# Patient Record
Sex: FEMALE | Race: BLACK OR AFRICAN AMERICAN | NOT HISPANIC OR LATINO | URBAN - METROPOLITAN AREA
[De-identification: names, ages, dates, MRNs, and addresses within clinical notes are randomized per-mention and may not be internally consistent; named-entity substitution may affect disease eponyms.]

---

## 2020-07-30 ENCOUNTER — OFFICE VISIT (OUTPATIENT)
Dept: URBAN - METROPOLITAN AREA CLINIC 12 | Facility: CLINIC | Age: 30
End: 2020-07-30

## 2020-09-24 ENCOUNTER — OFFICE VISIT (OUTPATIENT)
Dept: URBAN - METROPOLITAN AREA CLINIC 12 | Facility: CLINIC | Age: 30
End: 2020-09-24
Payer: COMMERCIAL

## 2020-09-24 DIAGNOSIS — R19.7 DIARRHEA: ICD-10-CM

## 2020-09-24 DIAGNOSIS — K51.90 ULCERATIVE COLITIS: ICD-10-CM

## 2020-09-24 PROCEDURE — G8427 DOCREV CUR MEDS BY ELIG CLIN: HCPCS | Performed by: INTERNAL MEDICINE

## 2020-09-24 PROCEDURE — G8420 CALC BMI NORM PARAMETERS: HCPCS | Performed by: INTERNAL MEDICINE

## 2020-09-24 PROCEDURE — G9903 PT SCRN TBCO ID AS NON USER: HCPCS | Performed by: INTERNAL MEDICINE

## 2020-09-24 PROCEDURE — 99214 OFFICE O/P EST MOD 30 MIN: CPT | Performed by: INTERNAL MEDICINE

## 2020-09-24 RX ORDER — MESALAMINE 1.2 G/1
TAKE 4 TABLETS (4.8 GRAM) BY ORAL ROUTE ONCE DAILY WITH A MEAL TABLET, DELAYED RELEASE ORAL 1
Qty: 360 | Refills: 3 | OUTPATIENT
Start: 2020-09-24 | End: 2021-09-19

## 2020-09-24 RX ORDER — MESALAMINE 1.2 G/1
2 TABLETS TABLET, DELAYED RELEASE ORAL ONCE A DAY
Status: ACTIVE | COMMUNITY

## 2020-09-24 NOTE — HPI-TODAY'S VISIT:
30-year-old female presented for diarrhea abdominal pain and occasional blood in the stool, the patient has history of ulcerative colitis diagnosed in 2016 she was on Lialda 4.8 g daily she had colonoscopy in 2018 revealed active colitis in the rectum and rectosigmoid area the rest of the colon was in remission but there was microscopic changes consistent with pancolitis, she was doing well till recently had multiple episodes of diarrhea or to go to the bathroom with occasionally fecal incontinence, no weight loss no nausea no vomiting she is currently on Lialda 2.4 g daily

## 2020-10-21 ENCOUNTER — TELEPHONE ENCOUNTER (OUTPATIENT)
Dept: URBAN - METROPOLITAN AREA CLINIC 12 | Facility: CLINIC | Age: 30
End: 2020-10-21

## 2020-12-28 ENCOUNTER — TELEPHONE ENCOUNTER (OUTPATIENT)
Dept: URBAN - METROPOLITAN AREA MEDICAL CENTER 27 | Facility: MEDICAL CENTER | Age: 30
End: 2020-12-28

## 2021-11-11 ENCOUNTER — OFFICE VISIT (OUTPATIENT)
Dept: URBAN - METROPOLITAN AREA CLINIC 98 | Facility: CLINIC | Age: 31
End: 2021-11-11

## 2021-12-16 ENCOUNTER — TELEPHONE ENCOUNTER (OUTPATIENT)
Dept: URBAN - METROPOLITAN AREA CLINIC 12 | Facility: CLINIC | Age: 31
End: 2021-12-16

## 2021-12-16 RX ORDER — MESALAMINE 1.2 G/1
4 TABLETS TABLET, DELAYED RELEASE ORAL ONCE A DAY
Qty: 360 TABLET | Refills: 0
Start: 2021-12-16 | End: 2022-03-16

## 2022-02-10 ENCOUNTER — OFFICE VISIT (OUTPATIENT)
Dept: URBAN - METROPOLITAN AREA CLINIC 12 | Facility: CLINIC | Age: 32
End: 2022-02-10

## 2022-09-28 ENCOUNTER — TELEPHONE ENCOUNTER (OUTPATIENT)
Dept: URBAN - METROPOLITAN AREA CLINIC 12 | Facility: CLINIC | Age: 32
End: 2022-09-28

## 2022-09-28 RX ORDER — MESALAMINE 1.2 G/1
4 TABLETS TABLET, DELAYED RELEASE ORAL ONCE A DAY
Qty: 360 TABLET | Refills: 0
Start: 2021-12-16 | End: 2022-12-27

## 2022-09-29 ENCOUNTER — OFFICE VISIT (OUTPATIENT)
Dept: URBAN - METROPOLITAN AREA CLINIC 12 | Facility: CLINIC | Age: 32
End: 2022-09-29

## 2022-10-07 ENCOUNTER — TELEPHONE ENCOUNTER (OUTPATIENT)
Dept: URBAN - METROPOLITAN AREA CLINIC 23 | Facility: CLINIC | Age: 32
End: 2022-10-07

## 2022-10-14 ENCOUNTER — OFFICE VISIT (OUTPATIENT)
Dept: URBAN - METROPOLITAN AREA CLINIC 111 | Facility: CLINIC | Age: 32
End: 2022-10-14

## 2022-12-22 ENCOUNTER — TELEPHONE ENCOUNTER (OUTPATIENT)
Dept: URBAN - METROPOLITAN AREA CLINIC 111 | Facility: CLINIC | Age: 32
End: 2022-12-22

## 2022-12-22 ENCOUNTER — TELEPHONE ENCOUNTER (OUTPATIENT)
Dept: URBAN - METROPOLITAN AREA CLINIC 5 | Facility: CLINIC | Age: 32
End: 2022-12-22

## 2022-12-22 ENCOUNTER — DASHBOARD ENCOUNTERS (OUTPATIENT)
Age: 32
End: 2022-12-22

## 2022-12-22 ENCOUNTER — LAB OUTSIDE AN ENCOUNTER (OUTPATIENT)
Dept: URBAN - METROPOLITAN AREA CLINIC 12 | Facility: CLINIC | Age: 32
End: 2022-12-22

## 2022-12-22 ENCOUNTER — OFFICE VISIT (OUTPATIENT)
Dept: URBAN - METROPOLITAN AREA CLINIC 12 | Facility: CLINIC | Age: 32
End: 2022-12-22
Payer: COMMERCIAL

## 2022-12-22 VITALS
HEART RATE: 70 BPM | WEIGHT: 150.2 LBS | HEIGHT: 68 IN | TEMPERATURE: 97.3 F | BODY MASS INDEX: 22.76 KG/M2 | SYSTOLIC BLOOD PRESSURE: 119 MMHG | DIASTOLIC BLOOD PRESSURE: 77 MMHG

## 2022-12-22 DIAGNOSIS — R14.0 BLOATING: ICD-10-CM

## 2022-12-22 DIAGNOSIS — K51.90 ULCERATIVE COLITIS: ICD-10-CM

## 2022-12-22 PROCEDURE — 99214 OFFICE O/P EST MOD 30 MIN: CPT | Performed by: INTERNAL MEDICINE

## 2022-12-22 RX ORDER — MESALAMINE 1.2 G/1
4 TABLETS TABLET, DELAYED RELEASE ORAL ONCE A DAY
Qty: 360 TABLET | Refills: 0 | Status: ACTIVE | COMMUNITY
Start: 2021-12-16 | End: 2022-12-27

## 2022-12-22 RX ORDER — MESALAMINE 1.2 G/1
4 TABLETS TABLET, DELAYED RELEASE ORAL ONCE A DAY
Qty: 360 TABLET | Refills: 0
Start: 2021-12-16 | End: 2023-03-22

## 2022-12-22 NOTE — HPI-TODAY'S VISIT:
32-year-old female with history of ulcerative colitis diagnosed in 2016 on Lialda 4.8 g a day.  Presented today for evaluation of increased gas bloating.  She reports she has foul-smelling gas and bloating associated with it which affecting her quality of life.  She has loose stool occasionally she is taking probiotic daily.  No blood in the stool she had occasional cramping.    she had colonoscopy in 2018 revealed active colitis in the rectum and rectosigmoid area the rest of the colon was in remission but there was microscopic changes consistent with pancolitis, s

## 2023-01-26 ENCOUNTER — TELEPHONE ENCOUNTER (OUTPATIENT)
Dept: URBAN - METROPOLITAN AREA CLINIC 12 | Facility: CLINIC | Age: 33
End: 2023-01-26

## 2023-01-31 PROBLEM — 64766004 ULCERATIVE COLITIS: Status: ACTIVE | Noted: 2023-01-31

## 2023-02-01 ENCOUNTER — TELEPHONE ENCOUNTER (OUTPATIENT)
Dept: URBAN - METROPOLITAN AREA CLINIC 5 | Facility: CLINIC | Age: 33
End: 2023-02-01

## 2023-02-06 ENCOUNTER — TELEPHONE ENCOUNTER (OUTPATIENT)
Dept: URBAN - METROPOLITAN AREA CLINIC 5 | Facility: CLINIC | Age: 33
End: 2023-02-06

## 2023-02-06 ENCOUNTER — TELEPHONE ENCOUNTER (OUTPATIENT)
Dept: URBAN - METROPOLITAN AREA CLINIC 23 | Facility: CLINIC | Age: 33
End: 2023-02-06

## 2023-02-27 ENCOUNTER — OFFICE VISIT (OUTPATIENT)
Dept: URBAN - METROPOLITAN AREA SURGERY CENTER 15 | Facility: SURGERY CENTER | Age: 33
End: 2023-02-27

## 2023-03-13 ENCOUNTER — TELEPHONE ENCOUNTER (OUTPATIENT)
Dept: URBAN - METROPOLITAN AREA CLINIC 78 | Facility: CLINIC | Age: 33
End: 2023-03-13

## 2023-03-13 ENCOUNTER — OFFICE VISIT (OUTPATIENT)
Dept: URBAN - METROPOLITAN AREA SURGERY CENTER 15 | Facility: SURGERY CENTER | Age: 33
End: 2023-03-13

## 2023-03-13 RX ORDER — MESALAMINE 1.2 G/1
4 TABLETS TABLET, DELAYED RELEASE ORAL ONCE A DAY
Qty: 360 TABLET | Refills: 0 | Status: ACTIVE | COMMUNITY
Start: 2021-12-16 | End: 2023-03-22

## 2023-03-13 RX ORDER — POLYETHYLENE GLYCOL 3350, SODIUM SULFATE, SODIUM CHLORIDE, POTASSIUM CHLORIDE, ASCORBIC ACID, SODIUM ASCORBATE 140-9-5.2G
AS DIRECTED KIT ORAL ONCE
Qty: 1 | Refills: 0 | OUTPATIENT
Start: 2023-03-14 | End: 2023-03-15

## 2023-04-13 ENCOUNTER — TELEPHONE ENCOUNTER (OUTPATIENT)
Dept: URBAN - METROPOLITAN AREA CLINIC 12 | Facility: CLINIC | Age: 33
End: 2023-04-13

## 2023-04-17 ENCOUNTER — CLAIMS CREATED FROM THE CLAIM WINDOW (OUTPATIENT)
Dept: URBAN - METROPOLITAN AREA SURGERY CENTER 15 | Facility: SURGERY CENTER | Age: 33
End: 2023-04-17
Payer: COMMERCIAL

## 2023-04-17 ENCOUNTER — CLAIMS CREATED FROM THE CLAIM WINDOW (OUTPATIENT)
Dept: URBAN - METROPOLITAN AREA SURGERY CENTER 15 | Facility: SURGERY CENTER | Age: 33
End: 2023-04-17

## 2023-04-17 ENCOUNTER — CLAIMS CREATED FROM THE CLAIM WINDOW (OUTPATIENT)
Dept: URBAN - METROPOLITAN AREA CLINIC 4 | Facility: CLINIC | Age: 33
End: 2023-04-17
Payer: COMMERCIAL

## 2023-04-17 DIAGNOSIS — K51.50 CHRONIC LEFT-SIDED ULCERATIVE COLITIS: ICD-10-CM

## 2023-04-17 DIAGNOSIS — K63.89 OTHER SPECIFIED DISEASES OF INTESTINE: ICD-10-CM

## 2023-04-17 DIAGNOSIS — K63.89 APPENDICITIS EPIPLOICA: ICD-10-CM

## 2023-04-17 PROCEDURE — G8907 PT DOC NO EVENTS ON DISCHARG: HCPCS | Performed by: INTERNAL MEDICINE

## 2023-04-17 PROCEDURE — 88305 TISSUE EXAM BY PATHOLOGIST: CPT | Performed by: PATHOLOGY

## 2023-04-17 PROCEDURE — 45380 COLONOSCOPY AND BIOPSY: CPT | Performed by: INTERNAL MEDICINE

## 2023-04-17 PROCEDURE — 88341 IMHCHEM/IMCYTCHM EA ADD ANTB: CPT | Performed by: PATHOLOGY

## 2023-04-17 PROCEDURE — 88342 IMHCHEM/IMCYTCHM 1ST ANTB: CPT | Performed by: PATHOLOGY

## 2025-03-24 ENCOUNTER — TELEPHONE ENCOUNTER (OUTPATIENT)
Dept: URBAN - METROPOLITAN AREA CLINIC 23 | Facility: CLINIC | Age: 35
End: 2025-03-24

## 2025-05-15 ENCOUNTER — OFFICE VISIT (OUTPATIENT)
Dept: URBAN - METROPOLITAN AREA CLINIC 12 | Facility: CLINIC | Age: 35
End: 2025-05-15
Payer: COMMERCIAL

## 2025-05-15 DIAGNOSIS — K59.09 INTERMITTENT CONSTIPATION: ICD-10-CM

## 2025-05-15 DIAGNOSIS — K51.90 ULCERATIVE COLITIS: ICD-10-CM

## 2025-05-15 PROBLEM — 14760008: Status: ACTIVE | Noted: 2025-05-15

## 2025-05-15 PROCEDURE — 99213 OFFICE O/P EST LOW 20 MIN: CPT

## 2025-05-15 RX ORDER — MESALAMINE 1.2 G/1
4 TABLETS WITH A MEAL TABLET, DELAYED RELEASE ORAL ONCE A DAY
Qty: 360 TABLET | Refills: 1 | OUTPATIENT
Start: 2025-05-15

## 2025-05-15 NOTE — HPI-TODAY'S VISIT:
Patient is a 35-year-old female with history of ulcerative colitis, diagnosed in 2016 on Lialda 4.8 g a day presents today for medication refills and follow-up. Her last colonoscopy was on 4/2023 with Dr. Guevara which revealed patchy mild chronic inactive colitis, consistent with quiescent ulcerative colitis in the rectum and descending colon. Repeating 3 years was advised.  She has been Lialda 4.8g daily with good symptom control; however, she has been out of the medication for the past two months. She denies abdominal pain, fecal urgency, rectal bleeding, rectal pain, or diarrhea. Overall, she feels well. She reports experiencing mild constipation at times, with bowel movements described as tiny jen in the morning. She has noted some symptom improvement with the use of a Squatty Potty and senna. Denies any other concerns or symptoms today.

## 2025-05-22 ENCOUNTER — OFFICE VISIT (OUTPATIENT)
Dept: URBAN - METROPOLITAN AREA CLINIC 12 | Facility: CLINIC | Age: 35
End: 2025-05-22

## 2025-07-10 ENCOUNTER — OFFICE VISIT (OUTPATIENT)
Dept: URBAN - METROPOLITAN AREA CLINIC 12 | Facility: CLINIC | Age: 35
End: 2025-07-10